# Patient Record
Sex: FEMALE | Race: WHITE
[De-identification: names, ages, dates, MRNs, and addresses within clinical notes are randomized per-mention and may not be internally consistent; named-entity substitution may affect disease eponyms.]

---

## 2019-11-25 ENCOUNTER — HOSPITAL ENCOUNTER (EMERGENCY)
Dept: HOSPITAL 25 - UCCORT | Age: 54
Discharge: HOME | End: 2019-11-25
Payer: COMMERCIAL

## 2019-11-25 VITALS — DIASTOLIC BLOOD PRESSURE: 77 MMHG | SYSTOLIC BLOOD PRESSURE: 128 MMHG

## 2019-11-25 DIAGNOSIS — M77.51: Primary | ICD-10-CM

## 2019-11-25 PROCEDURE — G0463 HOSPITAL OUTPT CLINIC VISIT: HCPCS

## 2019-11-25 PROCEDURE — 99212 OFFICE O/P EST SF 10 MIN: CPT

## 2019-11-25 NOTE — UC
Lower Extremity/Ankle HPI





- HPI Summary


HPI Summary: 





Pt presents with c/o atraumatic right ankle pain that began ~ 3 days ago.  Pt 

reports that she ran a marathon on November 9th and did not sustain any injury.

  Pt reports that she then began wearing boots that she hasn't worn in a while 

and began to develop right distal, anterior tibia.  





- History of Current Complaint


Chief Complaint: UCLowerExtremity


Stated Complaint: ANKLE PAIN


Time Seen by Provider: 11/25/19 16:37


Hx Obtained From: Patient


Hx Last Menstrual Period: none


Pregnant?: No


Onset/Duration: Sudden Onset, Lasting Days, Still Present


Severity Initially: Mild


Severity Currently: Moderate


Pain Intensity: 7


Aggravating Factor(s): Standing, Ambulation


Alleviating Factor(s): Rest, Elevation


Able to Bear Weight: Yes - painful





- Risk Factors


Gout Risk Factors: Negative


DVT Risk Factors: Negative


Septic Arthritis Risk Factor: Negative





- Allergies/Home Medications


Allergies/Adverse Reactions: 


 Allergies











Allergy/AdvReac Type Severity Reaction Status Date / Time


 


No Known Allergies Allergy   Verified 11/25/19 16:46











Home Medications: 


 Home Medications





Fluticasone NASAL SPRAY 50MCG* [Flonase NASAL SPRAY 50MCG*] 2 spray BOTH NARES 

DAILY 11/25/19 [History Confirmed 11/25/19]











PMH/Surg Hx/FS Hx/Imm Hx


Previously Healthy: Yes





- Surgical History


Surgical History: Yes


Surgery Procedure, Year, and Place: nose surgery





- Family History


Known Family History: Positive: Cardiac Disease





- Social History


Occupation: Employed Full-time


Lives: With Family


Alcohol Use: Occasionally


Substance Use Type: None


Smoking Status (MU): Never Smoked Tobacco


Have You Smoked in the Last Year: No





- Immunization History


Most Recent Influenza Vaccination: [id number]


Vaccination Up to Date: Yes





Review of Systems


All Other Systems Reviewed And Are Negative: Yes


Constitutional: Positive: Negative


Skin: Positive: Other - mild erythema to distal anterior rigth tibia


Eyes: Positive: Negative


ENT: Positive: Negative


Respiratory: Positive: Negative


Cardiovascular: Positive: Negative


Gastrointestinal: Positive: Negative


Genitourinary: Positive: Negative


Motor: Positive: Negative


Neurovascular: Positive: Negative


Musculoskeletal: Positive: Arthralgia, Edema, Myalgia


Neurological: Positive: Negative


Psychological: Positive: Negative


Is Patient Immunocompromised?: No





Physical Exam


Triage Information Reviewed: Yes


Appearance: Well-Appearing


Vital Signs: 


 Initial Vital Signs











Temp  99 F   11/25/19 16:40


 


Pulse  56   11/25/19 16:40


 


Resp  14   11/25/19 16:40


 


BP  128/77   11/25/19 16:40


 


Pulse Ox  99   11/25/19 16:40











Vital Signs Reviewed: Yes


Eye Exam: Normal


ENT Exam: Normal


Dental Exam: Normal


Neck exam: Normal


Respiratory: Positive: No respiratory distress


Musculoskeletal: Positive: Edema @ - mild edema to distal right anteior tibia


Neurological Exam: Normal


Psychological Exam: Normal


Skin Exam: Other - mild erythema distal anterior erythema





Diagnostics





- Radiology


  ** No standard instances


Radiology Interpretation Completed By: ED Physician - negative for fracture





Lower Extremity Course/Dx





- Course


Course Of Treatment: 





I discussed with the pt the need to f/o with ortho or sports medicine.  Pt 

states she already has an appointment with Dr. Mcclelland.  I also discussed the s/

sx of cellulitis and the need to monitor for possibility of the development of 

this skin infection.  Pt verbalized understanding and agreed to plan of care. 





- Differential Dx/Diagnosis


Differential Diagnosis/HQI/PQRI: Cellulitis, Fracture (Closed), Tendonitis, 

Tenosynovitis


Provider Diagnosis: 


 Tendonitis of ankle, right








Discharge ED





- Sign-Out/Discharge


Documenting (check all that apply): Patient Departure


All imaging exams completed and their final reports reviewed: No





- Discharge Plan


Condition: Stable


Disposition: HOME


Patient Education Materials:  Tenosynovitis (ED), Ice Pack Application (ED), 

Safe Use of NSAIDs (ED)


Referrals: 


Caroline Mcclelland MD [Medical Doctor] - As Soon As Possible


Janki Pedraza MD [Primary Care Provider] - If Needed





- Billing Disposition and Condition


Condition: STABLE


Disposition: Home Attending Attestation (For Attendings USE Only)...

## 2019-11-26 NOTE — UC
- Progress Note


Progress Note: 





Ankle XR wet read correct





Course/Dx





- Diagnoses


Provider Diagnoses: 


 Tendonitis of ankle, right








Discharge ED





- Sign-Out/Discharge


Documenting (check all that apply): Post-Discharge Follow Up


All imaging exams completed and their final reports reviewed: Yes





- Discharge Plan


Condition: Stable


Disposition: HOME


Patient Education Materials:  Tenosynovitis (ED), Ice Pack Application (ED), 

Safe Use of NSAIDs (ED)


Referrals: 


Caroline Mcclelland MD [Medical Doctor] - As Soon As Possible


Janki Pedraza MD [Primary Care Provider] - If Needed





- Billing Disposition and Condition


Condition: STABLE


Disposition: Home